# Patient Record
Sex: MALE | Race: WHITE
[De-identification: names, ages, dates, MRNs, and addresses within clinical notes are randomized per-mention and may not be internally consistent; named-entity substitution may affect disease eponyms.]

---

## 2017-08-07 ENCOUNTER — HOSPITAL ENCOUNTER (OUTPATIENT)
Dept: HOSPITAL 61 - PCVCIMAG | Age: 47
Discharge: HOME | End: 2017-08-07
Attending: INTERNAL MEDICINE
Payer: COMMERCIAL

## 2017-08-07 DIAGNOSIS — I10: ICD-10-CM

## 2017-08-07 DIAGNOSIS — I25.10: Primary | ICD-10-CM

## 2017-08-07 DIAGNOSIS — E78.5: ICD-10-CM

## 2017-08-07 DIAGNOSIS — E11.9: ICD-10-CM

## 2017-08-07 DIAGNOSIS — R94.31: ICD-10-CM

## 2017-08-07 DIAGNOSIS — I07.1: ICD-10-CM

## 2017-08-07 PROCEDURE — 93306 TTE W/DOPPLER COMPLETE: CPT

## 2017-08-07 PROCEDURE — 78452 HT MUSCLE IMAGE SPECT MULT: CPT

## 2017-08-07 PROCEDURE — A9500 TC99M SESTAMIBI: HCPCS

## 2017-08-07 PROCEDURE — 93017 CV STRESS TEST TRACING ONLY: CPT

## 2017-08-13 NOTE — PCVCIMAG
--------------- APPROVED REPORT --------------





Exam: Nuclear Stress Test

Indication: CAD , Abnormal EKG, Near Syncope

Patient Location: Out-Patient

Stress Nurse: Chelsey Mancera RN, DANNY Mtz Tech:Alondra LING Noe



Ht: 6 ft 0 in Wt: 215 lbs BSA:  2.20 m2

HR: 63 bpm                      BP: 131/78 mmHg         BMI:  

29.1

Rhythm:  SR



Medical History

Medical History: Hyperlipidemia, HTN, Diabetes

Medications: Aspirin, atorvastatin, insulin pump, tribenzor, tramadol,

 carvedilol (held 24h)

Allergies: No known drug allergies

Cardiac Risk Factors: Age

Pretest Chest Pain Characteristics: No chest pain

Physical Disabilities: Knee

Meds Held (24 hrs): Carvedilol



NM EXAM: Myocardial Perfusion REST/STRESS

Imaging Protocol: Rest Tc-99m/Stress Tc-99m 1 day



Resting Data

Rest SPECT myocardial perfusion imaging was performed in supine 

position 45 minutes following the intravenous injection of 10.4 mCi 

of Tc-99m Sestamibi.

Time of rest injection: 0830     Date: 08/07/2017



Pharmacologic Stress

Pharmacologic stress test was performed by injecting Regadenoson 0.4 

mg IV push followed by the intravenous injection of 33.7 mCi of 

Tc-99m Sestamibi.

Time of stress injection: 1030     Date: 08/07/2017

Administration Route: IV

Administration Site: Right AC

Gated Stress SPECT was performed 45 minutes after stress 

injection.

The images were gated to evaluate regional wall motion and calculate 

left ventricular ejection fraction. 



Study Quality

Study: Good



Study Data

Post stress, the left ventricular ejection was 73%..

SSS: 2

SRS: 0

SDS: 2

TID = 1.17.



Perfusion

No evidence of stress induced ischemia or prior myocardial 

infarction.



Wall Motion

Normal left ventricular size and function with no regional wall 

motion abnormalities.



Nuclear Conclusion

No evidence of stress induced ischemia or prior myocardial 

infarction.

Normal left ventricular size and function with no regional wall 

motion abnormalities.

Post stress, the left ventricular ejection was 73%.. 

No change since prior study dated December 2015.



Interpreted by:  Carlin Vela MD

Electronically Approved: 08/07/2017 17:39:18



Stress Test Details

Stress Test:  Pharmacologic stress testing performed using 0.4 mg of 

regadenoson per 5 mL given IV over 10 seconds.

  Reason for pharmacologic stress test: physical 

limitation.

HR

Resting HR:            63 bpmMax Heart Rate (APMHR): 175 bpm 

Max HR Achieved:  90 bpmTarget HR (85% APMHR): 148 bpm

% of APMHR:         51

Recovery HR:            80 bpm



BP

Resting BP:  131/78 mmHg

Max BP:       124/76 mmHg



ECG

Resting ECG:  Sinus rhythm

Stress ECG:     Sinus rhythm, Sinus Rhythm, NSSTT 

changes

Recovery ECG: Sinus rhythm



Clinical

Reason for Termination: Completed protocol

Stress Symptoms: Dyspnea, Chest pain

Symptoms resolved during recovery with caffeine.



Stress ECG Conclusion

ECG: Non-ischemic



&lt;Conclusion&gt;

ECG: Non-ischemic

## 2017-08-13 NOTE — PCVCIMAG
--------------- APPROVED REPORT --------------





Study performed:  2017 07:45:31



EXAM: Comprehensive 2D, Doppler, and color-flow 

Echocardiogram

Patient Location: Echo lab

Status:  routine



BSA:         2.18

HR: 62 bpmBP:          130/72 mmHg

Rhythm: NSR



Other Information 

Study Quality: Good



Risk Factors: 

Cardiac Risk Factors:  HTN



Indications

Diabetes

CAD

Chest pressure



2D Dimensions

LVEF(%):  53.02 (&gt;50%)

IVSd:  9.06 (7-11mm)

LVDd:  46.84 mm

PWd:  9.68 (7-11mm)

LVDs:  34.08 (25-40mm)

Left Atrium:  45.56 (27-40mm)

Aortic Root:  30.20 mm

LV Single Plane 4CH:  56.58 %

LV Single Plane 2CH:  62.74 %Gutierrez's LVEF:  59.66 %

Biplane EF:  60.6 %



Volumes

Left Atrial Volume (Systole)

Single Plane 4CH:  58.72 mLSingle Plane 2CH:  61.67 mL

LA ESV Index:  28.00 mL/m2



Aortic Valve

AoV Peak Demarco.:  1.43 m/s

AO Peak Gr.:  8.22 mmHgLVOT Max P.87 mmHg

LVOT Max V:  1.21 m/s



Mitral Valve

E/A Ratio:  1.8

MV Decel. Time:  273.43 ms

MV E Max Demarco.:  0.75 m/s

MV A Demarco.:  0.42 m/s

IVRT:  69.20 ms



TDI

E/Lateral E':  7.00E/Medial E':  8.00



Pulmonary Valve

PV Peak Demarco.:  1.05 m/sPV Peak Gr.:  4.38 mmHg



Pulmonary Vein

P Vein S:    0.38 m/sP Vein A:  0.32 m/s

P Vein D:   0.66 m/sP Vein A Dur.:  124.6 msec

P Vein S/D Ratio:  0.58



Tricuspid Valve

TR Peak Demarco.:  2.40 m/s

TR Peak Gr.:  23.02 mmHg



Left Ventricle

The left ventricle is normal size. There is normal LV segmental wall 

motion. There is normal left ventricular wall thickness. Left 

ventricular systolic function is normal. The left ventricular 

ejection fraction is within the normal range. LVEF is 60%. The left 

ventricular diastolic function is normal.



Right Ventricle

The right ventricle is normal size. The right ventricular systolic 

function is normal.



Atria

The left atrium size is normal. The right atrium size is 

normal.



Aortic Valve

The aortic valve is normal in structure. No aortic regurgitation is 

present. There is no aortic valvular stenosis.



Mitral Valve

The mitral valve is normal in structure. There is no mitral valve 

regurgitation noted. No evidence of mitral valve stenosis.



Tricuspid Valve

The tricuspid valve is normal in structure. Mild tricuspid 

regurgitation with PAP of 30 mmHg.



Pulmonic Valve

The pulmonary valve is normal in structure. There is no pulmonic 

valvular regurgitation.



Great Vessels

The aortic root is normal in size. IVC is normal in size and 

collapses with &gt;50% inspiration



Pericardium

There is no pericardial effusion.



&lt;Conclusion&gt;

Left ventricular systolic function is normal.

The left ventricular ejection fraction is within the normal 

range.

LVEF is 60%.

The right ventricle is normal size.

The left atrium size is normal.

The aortic valve is normal in structure.

There is no mitral valve regurgitation noted.

Mild tricuspid regurgitation with PAP of 30 mmHg.

There is no pericardial effusion.

## 2018-09-04 ENCOUNTER — HOSPITAL ENCOUNTER (OUTPATIENT)
Dept: HOSPITAL 61 - PCVCIMAG | Age: 48
Discharge: HOME | End: 2018-09-04
Attending: INTERNAL MEDICINE
Payer: COMMERCIAL

## 2018-09-04 DIAGNOSIS — R09.89: ICD-10-CM

## 2018-09-04 DIAGNOSIS — E11.9: ICD-10-CM

## 2018-09-04 DIAGNOSIS — I10: Primary | ICD-10-CM

## 2018-09-04 DIAGNOSIS — E78.5: ICD-10-CM

## 2018-09-04 PROCEDURE — 93880 EXTRACRANIAL BILAT STUDY: CPT

## 2019-11-26 ENCOUNTER — HOSPITAL ENCOUNTER (OUTPATIENT)
Dept: HOSPITAL 61 - PCVCIMAG | Age: 49
Discharge: HOME | End: 2019-11-26
Attending: INTERNAL MEDICINE
Payer: COMMERCIAL

## 2019-11-26 DIAGNOSIS — R53.83: ICD-10-CM

## 2019-11-26 DIAGNOSIS — E11.9: ICD-10-CM

## 2019-11-26 DIAGNOSIS — R06.00: ICD-10-CM

## 2019-11-26 DIAGNOSIS — I36.1: Primary | ICD-10-CM

## 2019-11-26 PROCEDURE — A9500 TC99M SESTAMIBI: HCPCS

## 2019-11-26 PROCEDURE — 93017 CV STRESS TEST TRACING ONLY: CPT

## 2019-11-26 PROCEDURE — 78452 HT MUSCLE IMAGE SPECT MULT: CPT

## 2019-11-26 PROCEDURE — 93306 TTE W/DOPPLER COMPLETE: CPT

## 2019-11-26 NOTE — PCVCIMAG
--------------- APPROVED REPORT --------------





Study performed:  2019 12:32:58



EXAM: Comprehensive 2D, Doppler, and color-flow 

Echocardiogram

Patient Location: Echo lab

Room #:  3Status:  routine



BSA:         2.16

HR: 67 bpmBP:          100/59 mmHg

Rhythm: NSR



Other Information 

Study Quality: Good



Risk Factors: 

Cardiac Risk Factors:  HTN, DM, FHX of 

CAD



Indications

Diabetes

Dyspnea 

Fatigue 



2D Dimensions

IVSd:  8.41 (7-11mm)LVOT Diam:  21.48 (18-24mm) 

LVDd:  49.19 mm

PWd:  7.02 (7-11mm)Ascending Ao:  29.92 (22-36mm)

LVDs:  35.65 (25-40mm)

Left Atrium:  40.79 (27-40mm)

Aortic Root:  27.86 mm

LV Single Plane 4CH:  61.69 %

LV Single Plane 2CH:  66.12 %

Biplane EF:  64.2 %



Volumes

Left Atrial Volume (Systole)

Single Plane 4CH:  43.68 mLSingle Plane 2CH:  56.85 mL

Biplane LA Volume:  54.00 mLLA ESV Index:  25.00 mL/m2



Aortic Valve

AoV Peak Demarco.:  1.40 m/s

AO Peak Gr.:  7.79 mmHgLVOT Max P.16 mmHg

LVOT Max V:  1.24 m/s

RAVINDER Vmax: 3.22 cm2



Mitral Valve

E/A Ratio:  1.8

MV Decel. Time:  165.29 ms

MV E Max Demarco.:  1.12 m/s

MV A Demarco.:  0.61 m/s

IVRT:  69.20 ms



TDI

E/Lateral E':  7.00E/Medial E':  9.33

Medial E' Demarco.:  0.12 m/s

Lateral E' Demarco.:  0.16 m/s



Pulmonary Valve

PV Peak Demarco.:  1.36 m/sPV Peak Gr.:  7.43 mmHg



Pulmonary Vein

P Vein S:    0.54 m/sP Vein A:  0.27 m/s

P Vein D:   0.66 m/sP Vein A Dur.:  76.1 msec

P Vein S/D Ratio:  0.82



Tricuspid Valve

TR Peak Demarco.:  2.35 m/s

TR Peak Gr.:  22.13 mmHg

TV Vmax:  0.62 m/sPA Pressure:  29.00 mmHg



Left Ventricle

The left ventricle is normal size. There is normal LV segmental wall 

motion. There is normal left ventricular wall thickness. Left 

ventricular systolic function is normal. The left ventricular 

ejection fraction is within the normal range. LVEF is 60-65%. The 

left ventricular diastolic function is normal.



Right Ventricle

The right ventricle is normal size. The right ventricular systolic 

function is normal.



Atria

The left atrium size is normal. The right atrium size is 

normal.



Aortic Valve

Aortic valve is trileaflet. The aortic valve is normal in structure 

and function. No aortic regurgitation is present. There is no aortic 

valvular stenosis.



Mitral Valve

The mitral valve is normal in structure. There is no mitral valve 

regurgitation noted. No evidence of mitral valve stenosis.



Tricuspid Valve

The tricuspid valve is normal in structure. Trace to mild tricuspid 

regurgitation with a PA pressure of 29 mmHg.



Pulmonic Valve

The pulmonary valve is normal in structure. Mild pulmonic 

regurgitation.



Great Vessels

The aortic root is normal in size. The ascending aorta is normal in 

size. Aortic arch is normal in caliber. IVC is normal in size and 

collapses >50% with inspiration.



Pericardium

There is no pericardial effusion. There is no pleural 

effusion.



<Conclusion>

The left ventricle is normal size.

LVEF is 60-65%.

The left ventricular diastolic function is normal.

The right ventricle is normal size.

The left atrium size is normal.

Aortic valve is trileaflet.

The aortic valve is normal in structure and function.

There is no aortic valvular stenosis.

There is no mitral valve regurgitation noted.

Trace to mild tricuspid regurgitation with a PA pressure of 29 

mmHg.

The aortic root is normal in size.

There is no pericardial effusion.

## 2019-11-27 NOTE — PCVCIMAG
--------------- APPROVED REPORT --------------





Imaging Protocol: Rest Tc-99m/Stress Tc-99m 1 day

Study performed:  11/26/2019 13:20:32



Indication: Palpitations, Dyspnea, Low Exercise Tolerance

Patient Location: Out-Patient

Stress Nurse: Chelsey Mancera RN

NM Tech:Raoul MooreLING matos



Ht: 6 ft 0 in Wt: 206 lbs BSA:  2.16 m2

HR: 75 bpm                      BP: 100/59 mmHg         BMI:  

27.9

Rhythm:  Sinus Rhythm, Incomplete Left Bundle Branch 

Block



Medical History

Medical History: Age, HTN, CVD, DM, Dyspnea, Family Hx 

CAD

Medications: ASA, Coreg, Tribenzor, Crestor

Allergies: No known drug allergies



Resting Data

Rest SPECT myocardial perfusion imaging was performed in supine 

position 45 minutes following the intravenous injection of 10.4 mCi 

of Tc-99m Sestamibi.

Time of rest injection: 1235     Date: 11/26/2019

Administration Route: IV

Administration Site: Right AC



Pharmacologic Stress

Pharmacologic stress test was performed by injecting Regadenoson 0.4 

mg IV push over 10-15 seconds immediately followed by the intravenous 

injection of 31.5 mCi of Tc-99m Sestamibi.

Time of stress injection: 1400     Date: 11/26/2019

Administration Route: IV

Administration Site: Right AC

The images were gated to evaluate regional wall motion and calculate 

left ventricular ejection fraction. 



Stress Test Details

Stress Test:  Pharmacologic stress testing performed using 0.4 mg of 

regadenoson per 5 mL given IV over 10 seconds.

  Reason for pharmacologic stress test: knee problems.



HRMax Heart Rate (APMHR): 172 bpm 

Resting HR:            75 bpmTarget HR (85% APMHR): 146 bpm

Max HR Achieved:  83 bpm

% of APMHR:         48

Recovery HR:            76 bpm



BP

Resting BP:  100/59 mmHg

Max BP:       111/66 mmHg

Recovery BP:       104/60 mmHg

ECG

Resting ECG:  Sinus Rhythm, Incomplete Left Bundle Branch 

Block

Stress ECG:     Sinus Rhythm, Incomplete Left Bundle Branch 

Block

Arrhythmia:    None

Recovery ECG: Sinus Rhythm, Incomplete Left Bundle Branch 

Block



Clinical

Reason for Termination: Completed protocol

Stress Symptoms: Headache

Symptoms resolved with caffeine.



Stress ECG Conclusion

non diagnostic LBBB



Study Quality

Study: Good



Study Data

Post stress, the left ventricular ejection was 67%..

SSS: 0

SRS: 1

SDS: 0

TID = 1.18.



Perfusion

No evidence of stress induced ischemia or prior myocardial 

infarction.



Wall Motion

Normal left ventricular size and function with no regional wall 

motion abnormalities.



Nuclear Conclusion

No evidence of stress induced ischemia or prior myocardial 

infarction.

Normal left ventricular size and function with no regional wall 

motion abnormalities.

Post stress, the left ventricular ejection was 67%. 

No change since prior study dated August 2017.



Interpreted by:  Carlin Vela MD

Electronically Approved: 11/26/2019 

16:00:47



<Conclusion>

non diagnostic LBBB

## 2021-05-13 ENCOUNTER — HOSPITAL ENCOUNTER (OUTPATIENT)
Dept: HOSPITAL 35 - RAD | Age: 51
End: 2021-05-13
Attending: FAMILY MEDICINE
Payer: COMMERCIAL

## 2021-05-13 DIAGNOSIS — M54.5: ICD-10-CM

## 2021-05-13 DIAGNOSIS — M51.36: Primary | ICD-10-CM

## 2021-05-16 ENCOUNTER — HOSPITAL ENCOUNTER (EMERGENCY)
Dept: HOSPITAL 35 - ER | Age: 51
Discharge: HOME | End: 2021-05-16
Payer: COMMERCIAL

## 2021-05-16 VITALS — BODY MASS INDEX: 29.12 KG/M2 | WEIGHT: 214.99 LBS | HEIGHT: 72 IN

## 2021-05-16 VITALS — DIASTOLIC BLOOD PRESSURE: 64 MMHG | SYSTOLIC BLOOD PRESSURE: 110 MMHG

## 2021-05-16 DIAGNOSIS — I10: ICD-10-CM

## 2021-05-16 DIAGNOSIS — Z79.899: ICD-10-CM

## 2021-05-16 DIAGNOSIS — Z79.82: ICD-10-CM

## 2021-05-16 DIAGNOSIS — M54.5: Primary | ICD-10-CM

## 2021-05-16 DIAGNOSIS — E11.9: ICD-10-CM

## 2021-05-16 DIAGNOSIS — E78.00: ICD-10-CM

## 2021-05-16 DIAGNOSIS — Z98.890: ICD-10-CM

## 2021-05-16 DIAGNOSIS — Z96.659: ICD-10-CM

## 2021-05-17 NOTE — EKG
[address] [employer]
[place]  [address]
Phone:  [phone]                    ELECTROCARDIOGRAM REPORT      
_______________________________________________________________________________
 
Name:       SAIDA RAM                  [facility]                     [facility]      [id number]     Account #:      [id number]  
Admission:  21    Attend Phys:                          
Discharge:  21    Date of Birth:  [birth year]  
                                                          Report #: [id number]
   [id number]
_______________________________________________________________________________
                         [facility] ED
                                       
Test Date:    2021               Test Time:    10:33:37
Pat Name:     SAIDA RAM               Department:   
Patient ID:   [id number]            Room:          
Gender:       M                        Technician:   lizz vanegas
:          1970               Requested By: Ash Palm
Order Number: [id number] MD:   Joe Nam
                                 Measurements
Intervals                              Axis          
Rate:         70                       P:            36
SD:           154                      QRS:          27
QRSD:         99                       T:            28
QT:           369                                    
QTc:          399                                    
                           Interpretive Statements
Sinus rhythm
Normal tracing
No previous ECG available for comparison
Electronically Signed On 2021 8:43:03 CDT by Joe Nam
[email or web address]
 
 
 
 
 
 
 
 
 
 
 
 
 
 
 
 
 
 
 
 
 
 
  <ELECTRONICALLY SIGNED>
   By: Joe Nam MD, [facility]   
  21     0843
D: 21 1033                           _____________________________________
T: 21 1033                           Joe Nam MD, FACC     /EPI

## 2021-08-20 ENCOUNTER — HOSPITAL ENCOUNTER (OUTPATIENT)
Dept: HOSPITAL 35 - ULTRA | Age: 51
End: 2021-08-20
Attending: FAMILY MEDICINE
Payer: COMMERCIAL

## 2021-08-20 DIAGNOSIS — N50.89: ICD-10-CM

## 2021-08-20 DIAGNOSIS — I86.1: Primary | ICD-10-CM

## 2021-12-14 ENCOUNTER — HOSPITAL ENCOUNTER (OUTPATIENT)
Dept: HOSPITAL 35 - SJCVCIMAG | Age: 51
End: 2021-12-14
Attending: INTERNAL MEDICINE
Payer: COMMERCIAL

## 2021-12-14 DIAGNOSIS — Z01.810: Primary | ICD-10-CM

## 2021-12-14 DIAGNOSIS — I25.10: ICD-10-CM

## 2021-12-14 DIAGNOSIS — R06.00: ICD-10-CM

## 2021-12-14 DIAGNOSIS — R93.1: ICD-10-CM
